# Patient Record
Sex: FEMALE | Race: WHITE | NOT HISPANIC OR LATINO | URBAN - METROPOLITAN AREA
[De-identification: names, ages, dates, MRNs, and addresses within clinical notes are randomized per-mention and may not be internally consistent; named-entity substitution may affect disease eponyms.]

---

## 2025-05-13 ENCOUNTER — INPATIENT (INPATIENT)
Facility: HOSPITAL | Age: 45
LOS: 0 days | Discharge: HOME | End: 2025-05-14
Attending: STUDENT IN AN ORGANIZED HEALTH CARE EDUCATION/TRAINING PROGRAM | Admitting: STUDENT IN AN ORGANIZED HEALTH CARE EDUCATION/TRAINING PROGRAM
Payer: COMMERCIAL

## 2025-05-13 VITALS
SYSTOLIC BLOOD PRESSURE: 99 MMHG | OXYGEN SATURATION: 96 % | DIASTOLIC BLOOD PRESSURE: 60 MMHG | TEMPERATURE: 101 F | RESPIRATION RATE: 16 BRPM | HEIGHT: 65 IN | HEART RATE: 97 BPM | WEIGHT: 130.07 LBS

## 2025-05-13 DIAGNOSIS — Z29.9 ENCOUNTER FOR PROPHYLACTIC MEASURES, UNSPECIFIED: ICD-10-CM

## 2025-05-13 DIAGNOSIS — A41.9 SEPSIS, UNSPECIFIED ORGANISM: ICD-10-CM

## 2025-05-13 DIAGNOSIS — D64.9 ANEMIA, UNSPECIFIED: ICD-10-CM

## 2025-05-13 DIAGNOSIS — R10.9 UNSPECIFIED ABDOMINAL PAIN: ICD-10-CM

## 2025-05-13 DIAGNOSIS — Z90.721 ACQUIRED ABSENCE OF OVARIES, UNILATERAL: Chronic | ICD-10-CM

## 2025-05-13 DIAGNOSIS — Z90.721 ACQUIRED ABSENCE OF OVARIES, UNILATERAL: ICD-10-CM

## 2025-05-13 DIAGNOSIS — N12 TUBULO-INTERSTITIAL NEPHRITIS, NOT SPECIFIED AS ACUTE OR CHRONIC: ICD-10-CM

## 2025-05-13 LAB
ADD ON TEST-SPECIMEN IN LAB: SIGNIFICANT CHANGE UP
ANION GAP SERPL CALC-SCNC: 9 MMOL/L — SIGNIFICANT CHANGE UP (ref 5–17)
APPEARANCE UR: CLEAR — SIGNIFICANT CHANGE UP
BASOPHILS # BLD AUTO: 0.02 K/UL — SIGNIFICANT CHANGE UP (ref 0–0.2)
BASOPHILS NFR BLD AUTO: 0.3 % — SIGNIFICANT CHANGE UP (ref 0–2)
BILIRUB UR-MCNC: NEGATIVE — SIGNIFICANT CHANGE UP
BUN SERPL-MCNC: 6 MG/DL — LOW (ref 7–23)
CALCIUM SERPL-MCNC: 8.7 MG/DL — SIGNIFICANT CHANGE UP (ref 8.4–10.5)
CHLORIDE SERPL-SCNC: 100 MMOL/L — SIGNIFICANT CHANGE UP (ref 96–108)
CO2 SERPL-SCNC: 25 MMOL/L — SIGNIFICANT CHANGE UP (ref 22–31)
COLOR SPEC: YELLOW — SIGNIFICANT CHANGE UP
CREAT SERPL-MCNC: 0.75 MG/DL — SIGNIFICANT CHANGE UP (ref 0.5–1.3)
DIFF PNL FLD: ABNORMAL
EGFR: 102 ML/MIN/1.73M2 — SIGNIFICANT CHANGE UP
EGFR: 102 ML/MIN/1.73M2 — SIGNIFICANT CHANGE UP
EOSINOPHIL # BLD AUTO: 0.01 K/UL — SIGNIFICANT CHANGE UP (ref 0–0.5)
EOSINOPHIL NFR BLD AUTO: 0.1 % — SIGNIFICANT CHANGE UP (ref 0–6)
FLUAV AG NPH QL: SIGNIFICANT CHANGE UP
FLUBV AG NPH QL: SIGNIFICANT CHANGE UP
GLUCOSE SERPL-MCNC: 138 MG/DL — HIGH (ref 70–99)
GLUCOSE UR QL: NEGATIVE MG/DL — SIGNIFICANT CHANGE UP
HCG UR QL: NEGATIVE — SIGNIFICANT CHANGE UP
HCT VFR BLD CALC: 30.9 % — LOW (ref 34.5–45)
HGB BLD-MCNC: 10.8 G/DL — LOW (ref 11.5–15.5)
IMM GRANULOCYTES NFR BLD AUTO: 0.3 % — SIGNIFICANT CHANGE UP (ref 0–0.9)
KETONES UR QL: 40 MG/DL
LACTATE SERPL-SCNC: 1 MMOL/L — SIGNIFICANT CHANGE UP (ref 0.5–2)
LEUKOCYTE ESTERASE UR-ACNC: ABNORMAL
LYMPHOCYTES # BLD AUTO: 0.74 K/UL — LOW (ref 1–3.3)
LYMPHOCYTES # BLD AUTO: 9.6 % — LOW (ref 13–44)
MCHC RBC-ENTMCNC: 31.7 PG — SIGNIFICANT CHANGE UP (ref 27–34)
MCHC RBC-ENTMCNC: 35 G/DL — SIGNIFICANT CHANGE UP (ref 32–36)
MCV RBC AUTO: 90.6 FL — SIGNIFICANT CHANGE UP (ref 80–100)
MONOCYTES # BLD AUTO: 0.72 K/UL — SIGNIFICANT CHANGE UP (ref 0–0.9)
MONOCYTES NFR BLD AUTO: 9.4 % — SIGNIFICANT CHANGE UP (ref 2–14)
NEUTROPHILS # BLD AUTO: 6.16 K/UL — SIGNIFICANT CHANGE UP (ref 1.8–7.4)
NEUTROPHILS NFR BLD AUTO: 80.3 % — HIGH (ref 43–77)
NITRITE UR-MCNC: NEGATIVE — SIGNIFICANT CHANGE UP
NRBC BLD AUTO-RTO: 0 /100 WBCS — SIGNIFICANT CHANGE UP (ref 0–0)
PH UR: 6 — SIGNIFICANT CHANGE UP (ref 5–8)
PLATELET # BLD AUTO: 170 K/UL — SIGNIFICANT CHANGE UP (ref 150–400)
POTASSIUM SERPL-MCNC: 4 MMOL/L — SIGNIFICANT CHANGE UP (ref 3.5–5.3)
POTASSIUM SERPL-SCNC: 4 MMOL/L — SIGNIFICANT CHANGE UP (ref 3.5–5.3)
PROT UR-MCNC: 30 MG/DL
RBC # BLD: 3.41 M/UL — LOW (ref 3.8–5.2)
RBC # FLD: 12.7 % — SIGNIFICANT CHANGE UP (ref 10.3–14.5)
RSV RNA NPH QL NAA+NON-PROBE: SIGNIFICANT CHANGE UP
SARS-COV-2 RNA SPEC QL NAA+PROBE: SIGNIFICANT CHANGE UP
SODIUM SERPL-SCNC: 134 MMOL/L — LOW (ref 135–145)
SOURCE RESPIRATORY: SIGNIFICANT CHANGE UP
SP GR SPEC: 1.01 — SIGNIFICANT CHANGE UP (ref 1–1.03)
UROBILINOGEN FLD QL: 0.2 MG/DL — SIGNIFICANT CHANGE UP (ref 0.2–1)
WBC # BLD: 7.67 K/UL — SIGNIFICANT CHANGE UP (ref 3.8–10.5)
WBC # FLD AUTO: 7.67 K/UL — SIGNIFICANT CHANGE UP (ref 3.8–10.5)

## 2025-05-13 PROCEDURE — 93010 ELECTROCARDIOGRAM REPORT: CPT

## 2025-05-13 PROCEDURE — 99285 EMERGENCY DEPT VISIT HI MDM: CPT

## 2025-05-13 PROCEDURE — 99223 1ST HOSP IP/OBS HIGH 75: CPT

## 2025-05-13 PROCEDURE — 74176 CT ABD & PELVIS W/O CONTRAST: CPT | Mod: 26

## 2025-05-13 RX ORDER — ACETAMINOPHEN 500 MG/5ML
650 LIQUID (ML) ORAL EVERY 6 HOURS
Refills: 0 | Status: DISCONTINUED | OUTPATIENT
Start: 2025-05-13 | End: 2025-05-14

## 2025-05-13 RX ORDER — KETOROLAC TROMETHAMINE 30 MG/ML
15 INJECTION, SOLUTION INTRAMUSCULAR; INTRAVENOUS ONCE
Refills: 0 | Status: DISCONTINUED | OUTPATIENT
Start: 2025-05-13 | End: 2025-05-13

## 2025-05-13 RX ORDER — MELATONIN 5 MG
3 TABLET ORAL AT BEDTIME
Refills: 0 | Status: DISCONTINUED | OUTPATIENT
Start: 2025-05-13 | End: 2025-05-14

## 2025-05-13 RX ORDER — ACETAMINOPHEN 500 MG/5ML
1000 LIQUID (ML) ORAL ONCE
Refills: 0 | Status: COMPLETED | OUTPATIENT
Start: 2025-05-13 | End: 2025-05-13

## 2025-05-13 RX ORDER — PIPERACILLIN-TAZO-DEXTROSE,ISO 2.25G/50ML
3.38 IV SOLUTION, PIGGYBACK PREMIX FROZEN(ML) INTRAVENOUS ONCE
Refills: 0 | Status: COMPLETED | OUTPATIENT
Start: 2025-05-13 | End: 2025-05-13

## 2025-05-13 RX ORDER — KETOROLAC TROMETHAMINE 30 MG/ML
15 INJECTION, SOLUTION INTRAMUSCULAR; INTRAVENOUS EVERY 12 HOURS
Refills: 0 | Status: DISCONTINUED | OUTPATIENT
Start: 2025-05-13 | End: 2025-05-14

## 2025-05-13 RX ORDER — CEFTRIAXONE 500 MG/1
2000 INJECTION, POWDER, FOR SOLUTION INTRAMUSCULAR; INTRAVENOUS EVERY 24 HOURS
Refills: 0 | Status: DISCONTINUED | OUTPATIENT
Start: 2025-05-13 | End: 2025-05-14

## 2025-05-13 RX ADMIN — Medication 1850 MILLILITER(S): at 15:48

## 2025-05-13 RX ADMIN — Medication 3 MILLIGRAM(S): at 22:58

## 2025-05-13 RX ADMIN — KETOROLAC TROMETHAMINE 15 MILLIGRAM(S): 30 INJECTION, SOLUTION INTRAMUSCULAR; INTRAVENOUS at 18:52

## 2025-05-13 RX ADMIN — Medication 200 GRAM(S): at 15:48

## 2025-05-13 RX ADMIN — Medication 400 MILLIGRAM(S): at 18:52

## 2025-05-13 RX ADMIN — CEFTRIAXONE 100 MILLIGRAM(S): 500 INJECTION, POWDER, FOR SOLUTION INTRAMUSCULAR; INTRAVENOUS at 22:32

## 2025-05-13 NOTE — H&P ADULT - PROBLEM SELECTOR PLAN 1
Patient met 2/4 sepsis criteria ( HR, fever) with source likely UTI and pyelonephritis.     - Start CTX 2 g ,till afebrile then finish a 7 day course with orals   - FU Bcx, Ucx   - Tylenol for fever   - Can give fluids if hypotension

## 2025-05-13 NOTE — ED PROVIDER NOTE - OBJECTIVE STATEMENT
44 year old F with no pertinent pmh presenting with fever X 1 day. Pt had UTI over last several days, was on macrobid for 3 doses. Now with L flank pain and fever, constant, radiating downwards. Was switched to cipro, took two doses with persistent fever. Urinary complaints improved. No ho kidney stones. No other acute complaints. ROS as above.

## 2025-05-13 NOTE — H&P ADULT - NSHPPHYSICALEXAM_GEN_ALL_CORE
PHYSICAL EXAM:  GENERAL: NAD, lying in bed comfortably  HEAD:  Atraumatic, Normocephalic  EYES: EOMI, PERRLA, conjunctiva and sclera clear  ENT: Moist mucous membranes  NECK: Supple, No JVD  CHEST/LUNG: Clear to auscultation bilaterally; No rales, rhonchi, wheezing, or rubs. Unlabored respirations  HEART: Regular rate and rhythm; No murmurs, rubs, or gallops  ABDOMEN: Bowel sounds present; Soft, left flank tenderness, Nondistended. No hepatomegally  EXTREMITIES:  2+ Peripheral Pulses, brisk capillary refill. No clubbing, cyanosis, or edema  NERVOUS SYSTEM:  Alert & Oriented X3, speech clear. No deficits   MSK: FROM all 4 extremities, full and equal strength  SKIN: No rashes or lesions Vital Signs Last 24 Hrs  T(C): 36.9 (13 May 2025 20:35), Max: 38.3 (13 May 2025 15:13)  T(F): 98.4 (13 May 2025 20:35), Max: 101 (13 May 2025 15:13)  HR: 61 (13 May 2025 20:35) (61 - 97)  BP: 96/59 (13 May 2025 20:35) (96/59 - 103/62)    RR: 17 (13 May 2025 20:35) (16 - 18)  SpO2: 97% (13 May 2025 20:35) (96% - 98%)    O2 Parameters below as of 13 May 2025 20:35  Patient On (Oxygen Delivery Method): room air      PHYSICAL EXAM:  GENERAL: NAD, lying in bed comfortably  HEAD:  Atraumatic, Normocephalic  EYES: EOMI, PERRLA, conjunctiva and sclera clear  ENT: Moist mucous membranes  NECK: Supple, No JVD  CHEST/LUNG: Clear to auscultation bilaterally; No rales, rhonchi, wheezing, or rubs. Unlabored respirations  HEART: Regular rate and rhythm; No murmurs, rubs, or gallops  ABDOMEN: Bowel sounds present; Soft, left flank tenderness, Nondistended. No hepatomegally  EXTREMITIES:  2+ Peripheral Pulses, brisk capillary refill. No clubbing, cyanosis, or edema  NERVOUS SYSTEM:  Alert & Oriented X3, speech clear. No deficits   MSK: FROM all 4 extremities, full and equal strength  SKIN: No rashes or lesions

## 2025-05-13 NOTE — ED PROVIDER NOTE - NS ED ROS FT
Constitutional: + fever or chills  Eyes: No discharge or drainage  Ears, Nose, Mouth, Throat: No nasal discharge, no sore throat  Cardiovascular: No chest pain, no palpitations  Respiratory: No shortness of breath, no cough  Gastrointestinal: No nausea or vomiting, +abdominal pain, no diarrhea or constipation  Musculoskeletal: No joint pain, no swelling  Skin: No rashes or lesions  Neurological: No numbness, weakness, tingling, no headache  Psychiatric: No depression

## 2025-05-13 NOTE — ED ADULT TRIAGE NOTE - CHIEF COMPLAINT QUOTE
left sided flank pain since yesterday, fevers at home  currently being treated ciprofloxacin for UTI starting on Sunday

## 2025-05-13 NOTE — H&P ADULT - HISTORY OF PRESENT ILLNESS
44 year old F with no pertinent pmh presenting with fever X 1 day. Pt had UTI over last several days, was on macrobid for 3 doses. Now with L flank pain and fever, constant, radiating downwards. Was switched to cipro, took two doses with persistent fever. Urinary complaints improved. No ho kidney stones. No other acute complaints. ROS as above.    In the ED:  Initial vital signs: T: 101 F, HR: 97 , BP: 99/60 , R: 16, SpO2: 96 % on RA  ED course:   Labs: significant for Hb 10.8, Neutrophil 80 %, UA + bacteria  Imaging:  CT A/P non con:  possible left sided acute pyelonephritis   EKG: NSR  Medications:  44 year old F with PMH of recurrent UTIs (last was 2 years ago), left oophorectomy following ectopic pregnancy, presenting with a week of dysuria and fever. She first was on macrobid for 3 doses. Then she over the weekend, she started having flank pain and fever. She went to her PCP office and was prescribed Cipro. She took a dose last night and one this morning. Despite the antibiotics, the patient kept spiking fever with 104 this morning associated with chills and increasing flank pain prompting her to come to the ED.   In the ED:  Initial vital signs: T: 101 F, HR: 97 , BP: 99/60 , R: 16, SpO2: 96 % on RA  ED course:   Labs: significant for Hb 10.8, Neutrophil 80 %, UA + bacteria  Imaging:  CT A/P non con:  possible left sided acute pyelonephritis   EKG: NSR  Medications: 2 L NS, 15 Toradol IV, 1 g Tylenol, 3.375 Zosyn

## 2025-05-13 NOTE — H&P ADULT - NSHPLABSRESULTS_GEN_ALL_CORE
LABS:                         10.8   7.67  )-----------( 170      ( 13 May 2025 15:35 )             30.9         134[L]  |  100  |  6[L]  ----------------------------<  138[H]  4.0   |  25  |  0.75    Ca    8.7      13 May 2025 15:35    TPro  6.8  /  Alb  3.8  /  TBili  0.2  /  DBili  0.1  /  AST  19  /  ALT  10  /  AlkPhos  46      PT/INR - ( 13 May 2025 15:35 )   PT: 12.8 sec;   INR: 1.11          PTT - ( 13 May 2025 15:35 )  PTT:28.8 sec  Urinalysis Basic - ( 13 May 2025 15:35 )    Color: Yellow / Appearance: Clear / S.015 / pH: x  Gluc: 138 mg/dL / Ketone: x  / Bili: Negative / Urobili: 0.2 mg/dL   Blood: x / Protein: 30 mg/dL / Nitrite: Negative   Leuk Esterase: Small / RBC: 3 /HPF / WBC 7 /HPF   Sq Epi: x / Non Sq Epi: 15 /HPF / Bacteria: Occasional /HPF        Lactate, Blood: 1.0 mmol/L ( @ 15:54)      RADIOLOGY, EKG & ADDITIONAL TESTS: Reviewed.

## 2025-05-13 NOTE — H&P ADULT - PROBLEM SELECTOR PLAN 4
Hb 10.8 on admission. Baseline 12 on 2015. MCV WNL.    - Iron studies, folate, B12  - Active type and screen as needed  - Maintain Hb > 7

## 2025-05-13 NOTE — ED ADULT NURSE NOTE - HIV OFFER
Is This A New Presentation, Or A Follow-Up?: Growth What Type Of Note Output Would You Prefer (Optional)?: Bullet Format Has Your Skin Lesion Been Treated?: not been treated Additional History: Patient states the growth has been drained in the past Opt out

## 2025-05-13 NOTE — H&P ADULT - NSHPREVIEWOFSYSTEMS_GEN_ALL_CORE
2 REVIEW OF SYSTEMS:      EYES/ENT: No visual changes;  No vertigo or throat pain   NECK: No pain or stiffness  RESPIRATORY: No cough, wheezing, hemoptysis; No shortness of breath  CARDIOVASCULAR: No chest pain or palpitations  GASTROINTESTINAL: No abdominal or epigastric pain. No nausea, vomiting, or hematemesis; No diarrhea or constipation. No melena or hematochezia.  NEUROLOGICAL: No numbness or weakness  SKIN: No itching, rashes

## 2025-05-13 NOTE — H&P ADULT - ASSESSMENT
44 year old F with PMH of recurrent UTIs (last was 2 years ago), left oophorectomy following ectopic pregnancy, presenting with a week of dysuria and fever. No improvement on oral Macrobid and Cipro. Found to have a likely pyelonephritis on CT. Admitted for IV antibiotics.

## 2025-05-13 NOTE — H&P ADULT - PROBLEM SELECTOR PLAN 6
Fluids: none  Electrolytes: replete as needed  Diet: regular  DVT: not needed  Code: full  Dispo: [facility]

## 2025-05-13 NOTE — H&P ADULT - ATTENDING COMMENTS
45 yo F with PMHx recurrent UTI, ectopic pregnancy (s/p L oophorectomy) px from home with 1-2d hx of L flank pain and fever, admitted for further evaluation and management of sepsis 2/2 pyelonephritis.      Meeting 2/4 SIRS criteria (Tmax 101F, HR>90). Remainder of VSS. EKG reviewed (NSR, incomplete RBBB, ). Labs reviewed. No significant leukocytosis or bandemia. Hb 10.8 (MCV 90.6). Remainder of CBC/CMP largely within normal limits. UA with 40 ketone, small blood/RBC, 7 WBC, occasional bacteria. Pt currently being tx for UTI with course of Macrobid > Ciprofloxacin prescribed by outpatient provider. CTAP demonstrating subtle infiltration of L perinephric fat suspicious for L pyelonephritis. Additionally with L CVA tenderness on exam. Favor tx with IV abx pending additional cx data. May be confounded by recent abx tx.     [ ] CTX 2g IV Q24   [ ] Blood cx x2 + Urine cx (Sent from ED)   [ ] Outpatient PCP collateral regarding recent cx data*

## 2025-05-13 NOTE — ED ADULT NURSE NOTE - OBJECTIVE STATEMENT
44yF no pmhx presents to the ER complaining of left sided flank pain. Patient states on Sunday "diagnosed with UTI and then it got worse and yesterday my doctor told me it spread to the kidneys and started me on new antibiotics". Endorses fever highest 104, has been taking Advil and Tylenol regularly. Pain upon urination has improved. Endorses nausea. Denies Cp/SOB, V/D.

## 2025-05-13 NOTE — ED PROVIDER NOTE - CLINICAL SUMMARY MEDICAL DECISION MAKING FREE TEXT BOX
45 yo with flank pain, fever, concern for possible pyelo vs infected stone, will do sepsis workup, ct to evaluate for stone, iv abx, reassess

## 2025-05-14 ENCOUNTER — TRANSCRIPTION ENCOUNTER (OUTPATIENT)
Age: 45
End: 2025-05-14

## 2025-05-14 VITALS
DIASTOLIC BLOOD PRESSURE: 70 MMHG | OXYGEN SATURATION: 97 % | RESPIRATION RATE: 18 BRPM | TEMPERATURE: 98 F | SYSTOLIC BLOOD PRESSURE: 109 MMHG | HEART RATE: 68 BPM

## 2025-05-14 LAB
ALBUMIN SERPL ELPH-MCNC: 3.8 G/DL — SIGNIFICANT CHANGE UP (ref 3.3–5)
ALP SERPL-CCNC: 43 U/L — SIGNIFICANT CHANGE UP (ref 40–120)
ALT FLD-CCNC: 26 U/L — SIGNIFICANT CHANGE UP (ref 10–45)
ANION GAP SERPL CALC-SCNC: 10 MMOL/L — SIGNIFICANT CHANGE UP (ref 5–17)
AST SERPL-CCNC: 32 U/L — SIGNIFICANT CHANGE UP (ref 10–40)
BASOPHILS # BLD AUTO: 0.01 K/UL — SIGNIFICANT CHANGE UP (ref 0–0.2)
BASOPHILS NFR BLD AUTO: 0.2 % — SIGNIFICANT CHANGE UP (ref 0–2)
BILIRUB SERPL-MCNC: <0.2 MG/DL — SIGNIFICANT CHANGE UP (ref 0.2–1.2)
BUN SERPL-MCNC: 5 MG/DL — LOW (ref 7–23)
CALCIUM SERPL-MCNC: 8.6 MG/DL — SIGNIFICANT CHANGE UP (ref 8.4–10.5)
CHLORIDE SERPL-SCNC: 104 MMOL/L — SIGNIFICANT CHANGE UP (ref 96–108)
CO2 SERPL-SCNC: 25 MMOL/L — SIGNIFICANT CHANGE UP (ref 22–31)
CREAT SERPL-MCNC: 0.66 MG/DL — SIGNIFICANT CHANGE UP (ref 0.5–1.3)
EGFR: 112 ML/MIN/1.73M2 — SIGNIFICANT CHANGE UP
EGFR: 112 ML/MIN/1.73M2 — SIGNIFICANT CHANGE UP
EOSINOPHIL # BLD AUTO: 0.02 K/UL — SIGNIFICANT CHANGE UP (ref 0–0.5)
EOSINOPHIL NFR BLD AUTO: 0.4 % — SIGNIFICANT CHANGE UP (ref 0–6)
FERRITIN SERPL-MCNC: 172 NG/ML — HIGH (ref 15–150)
FOLATE SERPL-MCNC: 18.8 NG/ML — SIGNIFICANT CHANGE UP
GLUCOSE SERPL-MCNC: 104 MG/DL — HIGH (ref 70–99)
HCT VFR BLD CALC: 29.4 % — LOW (ref 34.5–45)
HGB BLD-MCNC: 9.8 G/DL — LOW (ref 11.5–15.5)
IMM GRANULOCYTES NFR BLD AUTO: 0.2 % — SIGNIFICANT CHANGE UP (ref 0–0.9)
IRON SATN MFR SERPL: 4 % — LOW (ref 14–50)
IRON SATN MFR SERPL: 7 UG/DL — LOW (ref 30–160)
LYMPHOCYTES # BLD AUTO: 0.94 K/UL — LOW (ref 1–3.3)
LYMPHOCYTES # BLD AUTO: 19.7 % — SIGNIFICANT CHANGE UP (ref 13–44)
MAGNESIUM SERPL-MCNC: 2 MG/DL — SIGNIFICANT CHANGE UP (ref 1.6–2.6)
MCHC RBC-ENTMCNC: 31.5 PG — SIGNIFICANT CHANGE UP (ref 27–34)
MCHC RBC-ENTMCNC: 33.3 G/DL — SIGNIFICANT CHANGE UP (ref 32–36)
MCV RBC AUTO: 94.5 FL — SIGNIFICANT CHANGE UP (ref 80–100)
MONOCYTES # BLD AUTO: 0.61 K/UL — SIGNIFICANT CHANGE UP (ref 0–0.9)
MONOCYTES NFR BLD AUTO: 12.8 % — SIGNIFICANT CHANGE UP (ref 2–14)
NEUTROPHILS # BLD AUTO: 3.17 K/UL — SIGNIFICANT CHANGE UP (ref 1.8–7.4)
NEUTROPHILS NFR BLD AUTO: 66.7 % — SIGNIFICANT CHANGE UP (ref 43–77)
NRBC BLD AUTO-RTO: 0 /100 WBCS — SIGNIFICANT CHANGE UP (ref 0–0)
PHOSPHATE SERPL-MCNC: 2.6 MG/DL — SIGNIFICANT CHANGE UP (ref 2.5–4.5)
PLATELET # BLD AUTO: 165 K/UL — SIGNIFICANT CHANGE UP (ref 150–400)
POTASSIUM SERPL-MCNC: 4.4 MMOL/L — SIGNIFICANT CHANGE UP (ref 3.5–5.3)
POTASSIUM SERPL-SCNC: 4.4 MMOL/L — SIGNIFICANT CHANGE UP (ref 3.5–5.3)
PROT SERPL-MCNC: 6 G/DL — SIGNIFICANT CHANGE UP (ref 6–8.3)
RBC # BLD: 3.11 M/UL — LOW (ref 3.8–5.2)
RBC # FLD: 13 % — SIGNIFICANT CHANGE UP (ref 10.3–14.5)
SODIUM SERPL-SCNC: 139 MMOL/L — SIGNIFICANT CHANGE UP (ref 135–145)
TIBC SERPL-MCNC: 197 UG/DL — LOW (ref 220–430)
TRANSFERRIN SERPL-MCNC: 162 MG/DL — LOW (ref 200–360)
UIBC SERPL-MCNC: 190 UG/DL — SIGNIFICANT CHANGE UP (ref 110–370)
VIT B12 SERPL-MCNC: 349 PG/ML — SIGNIFICANT CHANGE UP (ref 232–1245)
WBC # BLD: 4.76 K/UL — SIGNIFICANT CHANGE UP (ref 3.8–10.5)
WBC # FLD AUTO: 4.76 K/UL — SIGNIFICANT CHANGE UP (ref 3.8–10.5)

## 2025-05-14 PROCEDURE — 83690 ASSAY OF LIPASE: CPT

## 2025-05-14 PROCEDURE — 82607 VITAMIN B-12: CPT

## 2025-05-14 PROCEDURE — 80053 COMPREHEN METABOLIC PANEL: CPT

## 2025-05-14 PROCEDURE — 96374 THER/PROPH/DIAG INJ IV PUSH: CPT

## 2025-05-14 PROCEDURE — 82728 ASSAY OF FERRITIN: CPT

## 2025-05-14 PROCEDURE — 82746 ASSAY OF FOLIC ACID SERUM: CPT

## 2025-05-14 PROCEDURE — 80048 BASIC METABOLIC PNL TOTAL CA: CPT

## 2025-05-14 PROCEDURE — 87637 SARSCOV2&INF A&B&RSV AMP PRB: CPT

## 2025-05-14 PROCEDURE — 87086 URINE CULTURE/COLONY COUNT: CPT

## 2025-05-14 PROCEDURE — 85025 COMPLETE CBC W/AUTO DIFF WBC: CPT

## 2025-05-14 PROCEDURE — 99239 HOSP IP/OBS DSCHRG MGMT >30: CPT | Mod: GC

## 2025-05-14 PROCEDURE — 81025 URINE PREGNANCY TEST: CPT

## 2025-05-14 PROCEDURE — 84466 ASSAY OF TRANSFERRIN: CPT

## 2025-05-14 PROCEDURE — 96375 TX/PRO/DX INJ NEW DRUG ADDON: CPT

## 2025-05-14 PROCEDURE — 74176 CT ABD & PELVIS W/O CONTRAST: CPT

## 2025-05-14 PROCEDURE — 87040 BLOOD CULTURE FOR BACTERIA: CPT

## 2025-05-14 PROCEDURE — 84100 ASSAY OF PHOSPHORUS: CPT

## 2025-05-14 PROCEDURE — 83550 IRON BINDING TEST: CPT

## 2025-05-14 PROCEDURE — 85730 THROMBOPLASTIN TIME PARTIAL: CPT

## 2025-05-14 PROCEDURE — 36415 COLL VENOUS BLD VENIPUNCTURE: CPT

## 2025-05-14 PROCEDURE — 80076 HEPATIC FUNCTION PANEL: CPT

## 2025-05-14 PROCEDURE — 83735 ASSAY OF MAGNESIUM: CPT

## 2025-05-14 PROCEDURE — 81001 URINALYSIS AUTO W/SCOPE: CPT

## 2025-05-14 PROCEDURE — 83605 ASSAY OF LACTIC ACID: CPT

## 2025-05-14 PROCEDURE — 99285 EMERGENCY DEPT VISIT HI MDM: CPT | Mod: 25

## 2025-05-14 PROCEDURE — 85610 PROTHROMBIN TIME: CPT

## 2025-05-14 PROCEDURE — 93005 ELECTROCARDIOGRAM TRACING: CPT

## 2025-05-14 PROCEDURE — 83540 ASSAY OF IRON: CPT

## 2025-05-14 RX ORDER — IBUPROFEN 200 MG
400 TABLET ORAL ONCE
Refills: 0 | Status: DISCONTINUED | OUTPATIENT
Start: 2025-05-14 | End: 2025-05-14

## 2025-05-14 RX ORDER — FERROUS SULFATE 137(45) MG
1 TABLET, EXTENDED RELEASE ORAL
Qty: 15 | Refills: 2
Start: 2025-05-14 | End: 2025-08-11

## 2025-05-14 RX ORDER — ACETAMINOPHEN 500 MG/5ML
650 LIQUID (ML) ORAL EVERY 6 HOURS
Refills: 0 | Status: DISCONTINUED | OUTPATIENT
Start: 2025-05-14 | End: 2025-05-14

## 2025-05-14 RX ORDER — CEFTRIAXONE 500 MG/1
2000 INJECTION, POWDER, FOR SOLUTION INTRAMUSCULAR; INTRAVENOUS EVERY 24 HOURS
Refills: 0 | Status: DISCONTINUED | OUTPATIENT
Start: 2025-05-14 | End: 2025-05-14

## 2025-05-14 RX ADMIN — Medication 650 MILLIGRAM(S): at 03:18

## 2025-05-14 RX ADMIN — Medication 650 MILLIGRAM(S): at 13:58

## 2025-05-14 RX ADMIN — CEFTRIAXONE 100 MILLIGRAM(S): 500 INJECTION, POWDER, FOR SOLUTION INTRAMUSCULAR; INTRAVENOUS at 11:58

## 2025-05-14 NOTE — PROGRESS NOTE ADULT - PROBLEM SELECTOR PLAN 3
2/2 ectopic pregnancy in 2019. No current issues 2/2 ectopic pregnancy in 2019. No current issues  No other

## 2025-05-14 NOTE — DISCHARGE NOTE PROVIDER - NSDCMRMEDTOKEN_GEN_ALL_CORE_FT
Bactrim  mg-160 mg oral tablet: 1 tab(s) orally 2 times a day Please take 1 tablet twice a day starting on 5/15 AM   Bactrim  mg-160 mg oral tablet: 1 tab(s) orally 2 times a day Please take 1 tablet twice a day starting on 5/15 AM  ferrous sulfate 325 mg (65 mg elemental iron) oral tablet: 1 tab(s) orally every other day Please take one tablet every other day

## 2025-05-14 NOTE — PROGRESS NOTE ADULT - PROBLEM SELECTOR PLAN 6
Fluids: none  Electrolytes: replete as needed  Diet: regular  DVT: not needed  Code: full  Dispo: [facility]

## 2025-05-14 NOTE — DISCHARGE NOTE PROVIDER - CARE PROVIDER_API CALL
CHRISTA LUONG  [address] [place] [address]  Phone: [phone]  Fax: [phone]  Established Patient  Follow Up Time: 1 week

## 2025-05-14 NOTE — DISCHARGE NOTE PROVIDER - NSDCFUADDAPPT_GEN_ALL_CORE_FT
Please follow up with your primary care provider within 1 weeks of this hospitalization. Please call the office to make an appointment.   Please follow up with your primary care provider within 1 week of this hospitalization. Please call the office to make an appointment.

## 2025-05-14 NOTE — DISCHARGE NOTE NURSING/CASE MANAGEMENT/SOCIAL WORK - FINANCIAL ASSISTANCE
[facility] provides services at a reduced cost to those who are determined to be eligible through [facility]’s financial assistance program. Information regarding [facility]’s financial assistance program can be found by going to [email or web address] or by calling [phone].

## 2025-05-14 NOTE — DISCHARGE NOTE NURSING/CASE MANAGEMENT/SOCIAL WORK - NSDCFUADDAPPT_GEN_ALL_CORE_FT
Please follow up with your primary care provider within 1 week of this hospitalization. Please call the office to make an appointment.

## 2025-05-14 NOTE — PROGRESS NOTE ADULT - PROBLEM SELECTOR PLAN 1
Patient met 2/4 sepsis criteria ( HR, fever) with source likely UTI and pyelonephritis.     - Start CTX 2 g ,till afebrile then finish a 7 day course with orals   - FU Bcx, Ucx   - Tylenol for fever   - Can give fluids if hypotension Patient met 2/4 sepsis criteria ( HR, fever) with source likely UTI and pyelonephritis.   Culture data obtained from outpatient records on original culture > appears to be pan-sensitive E. coli    - Start CTX 2 g until afebrile then finish a 7 day course with orals   - FU Bcx, Ucx   - Tylenol for fever   - Can give fluids if hypotension Patient met 2/4 sepsis criteria ( HR, fever) with source likely UTI and pyelonephritis.   Culture data obtained from outpatient records on original culture > appears to be pan-sensitive E. coli    - Start CTX 2 g > transition to bactrim to complete 10 day course  - FU Bcx, Ucx   - Tylenol for fever   - Can give fluids if hypotension

## 2025-05-14 NOTE — DISCHARGE NOTE NURSING/CASE MANAGEMENT/SOCIAL WORK - PATIENT PORTAL LINK FT
You can access the FollowMyHealth Patient Portal offered by [facility] by registering at the following website: [email or web address]. By joining [employer]’s FollowMyHealth portal, you will also be able to view your health information using other applications (apps) compatible with our system.

## 2025-05-14 NOTE — DISCHARGE NOTE PROVIDER - ATTENDING DISCHARGE PHYSICAL EXAMINATION:
GEN: Resting comfortably in NAD  ENMT: Moist oral mucosa; no conjunctival injection  RESP: No respiratory distress, no use of accessory muscles; CTA b/l, no WRR  CV: RRR, +S1S2, no MRG; no JVD; no peripheral edema  2+ radial, DP, PT   GI: Soft, ND, no rebound, no guarding.   MSK: No digital clubbing or cyanosis. Mild L flank tenderness  SKIN: No rashes or ulcers noted  NEURO: Answers questions appropriately, moving all extremities spontaneously GEN: Resting comfortably in NAD  ENMT: Moist oral mucosa; no conjunctival injection  RESP: No respiratory distress, no use of accessory muscles; CTA b/l, no WRR  CV: RRR, +S1S2, no MRG; no JVD; no peripheral edema  2+ radial, DP, PT   GI: Soft, ND, no rebound, no guarding..   MSK: No digital clubbing or cyanosis. Mild L flank tenderness  SKIN: No rashes or ulcers noted  NEURO: Answers questions appropriately, moving all extremities spontaneously

## 2025-05-14 NOTE — PROGRESS NOTE ADULT - SUBJECTIVE AND OBJECTIVE BOX
OVERNIGHT EVENTS:  No acute events overnight.    SUBJECTIVE:  The patient was seen and examined at the bedside this AM.     VITAL SIGNS:  Vital Signs Last 12 Hrs  T(F): 98.1 (05-14-25 @ 05:49), Max: 98.4 (05-13-25 @ 20:35)  HR: 61 (05-14-25 @ 05:49) (61 - 62)  BP: 100/65 (05-14-25 @ 05:49) (96/59 - 100/65)  BP(mean): 77 (05-14-25 @ 05:49) (77 - 77)  RR: 18 (05-14-25 @ 05:49) (17 - 18)  SpO2: 96% (05-14-25 @ 05:49) (96% - 98%)  I&O's Summary      PHYSICAL EXAM:  GEN: Resting comfortably in NAD  ENMT: Moist oral mucosa; no conjunctival injection  RESP: No respiratory distress, no use of accessory muscles; CTA b/l, no WRR  CV: RRR, +S1S2, no MRG; no JVD; no peripheral edema  2+ radial, DP, PT   GI: Soft, NT, ND, no rebound, no guarding  MSK: No digital clubbing or cyanosis  SKIN: No rashes or ulcers noted  NEURO: Answers questions appropriately, moving all extremities spontaneously    MEDICATIONS:  MEDICATIONS  (STANDING):  cefTRIAXone   IVPB 2000 milliGRAM(s) IV Intermittent every 24 hours    MEDICATIONS  (PRN):  acetaminophen     Tablet .. 650 milliGRAM(s) Oral every 6 hours PRN Temp greater or equal to 38C (100.4F), Mild Pain (1 - 3)  ketorolac   Injectable 15 milliGRAM(s) IV Push every 12 hours PRN Severe Pain (7 - 10)  melatonin 3 milliGRAM(s) Oral at bedtime PRN Insomnia      ALLERGIES:  Allergies    No Known Allergies    Intolerances        LABS:                        10.8   7.67  )-----------( 170      ( 13 May 2025 15:35 )             30.9     05-13    134[L]  |  100  |  6[L]  ----------------------------<  138[H]  4.0   |  25  |  0.75    Ca    8.7      13 May 2025 15:35    TPro  6.8  /  Alb  3.8  /  TBili  0.2  /  DBili  0.1  /  AST  19  /  ALT  10  /  AlkPhos  46  05-13    PT/INR - ( 13 May 2025 15:35 )   PT: 12.8 sec;   INR: 1.11          PTT - ( 13 May 2025 15:35 )  PTT:28.8 sec    RADIOLOGY & ADDITIONAL TESTS: Reviewed. OVERNIGHT EVENTS:  No acute events overnight.    SUBJECTIVE:  The patient was seen and examined at the bedside this AM. Continues to have some mild flank pain and moderate headache. Otherwise feels better than since admission. No further dysuria. Denies fevers, chest pain, palpitations, SOB, abdominal pain, nausea, vomiting, diarrhea.     VITAL SIGNS:  Vital Signs Last 12 Hrs  T(F): 98.1 (05-14-25 @ 05:49), Max: 98.4 (05-13-25 @ 20:35)  HR: 61 (05-14-25 @ 05:49) (61 - 62)  BP: 100/65 (05-14-25 @ 05:49) (96/59 - 100/65)  BP(mean): 77 (05-14-25 @ 05:49) (77 - 77)  RR: 18 (05-14-25 @ 05:49) (17 - 18)  SpO2: 96% (05-14-25 @ 05:49) (96% - 98%)  I&O's Summary      PHYSICAL EXAM:  GEN: Resting comfortably in NAD  ENMT: Moist oral mucosa; no conjunctival injection  RESP: No respiratory distress, no use of accessory muscles; CTA b/l, no WRR  CV: RRR, +S1S2, no MRG; no JVD; no peripheral edema  2+ radial, DP, PT   GI: Soft, ND, no rebound, no guarding. Mildly tender to deep palpation in LLQ.  MSK: No digital clubbing or cyanosis. Mild L flank tenderness  SKIN: No rashes or ulcers noted  NEURO: Answers questions appropriately, moving all extremities spontaneously    MEDICATIONS:  MEDICATIONS  (STANDING):  cefTRIAXone   IVPB 2000 milliGRAM(s) IV Intermittent every 24 hours    MEDICATIONS  (PRN):  acetaminophen     Tablet .. 650 milliGRAM(s) Oral every 6 hours PRN Temp greater or equal to 38C (100.4F), Mild Pain (1 - 3)  ketorolac   Injectable 15 milliGRAM(s) IV Push every 12 hours PRN Severe Pain (7 - 10)  melatonin 3 milliGRAM(s) Oral at bedtime PRN Insomnia      ALLERGIES:  Allergies    No Known Allergies    Intolerances        LABS:                        10.8   7.67  )-----------( 170      ( 13 May 2025 15:35 )             30.9     05-13    134[L]  |  100  |  6[L]  ----------------------------<  138[H]  4.0   |  25  |  0.75    Ca    8.7      13 May 2025 15:35    TPro  6.8  /  Alb  3.8  /  TBili  0.2  /  DBili  0.1  /  AST  19  /  ALT  10  /  AlkPhos  46  05-13    PT/INR - ( 13 May 2025 15:35 )   PT: 12.8 sec;   INR: 1.11          PTT - ( 13 May 2025 15:35 )  PTT:28.8 sec    RADIOLOGY & ADDITIONAL TESTS: Reviewed.

## 2025-05-14 NOTE — DISCHARGE NOTE PROVIDER - NSDCCPTREATMENT_GEN_ALL_CORE_FT
PRINCIPAL PROCEDURE  Procedure: CT abdomen  Findings and Treatment: FINDINGS:  LOWER CHEST: Low density of cardiac chambers relative to the myocardium suggesting anemia.  LIVER: Within normal limits. Reidel's lobe of liver.  BILE DUCTS: Normal caliber.  GALLBLADDER: Within normal limits.  SPLEEN: Within normal limits.  PANCREAS: Within normal limits.  ADRENALS: Within normal limits.  KIDNEYS/URETERS: No renal stones or obstructive hydronephrosis. No ureteric calculus. Mild fullness of the left renal collecting system..   There appears to be some subtle infiltration of the left perinephric fat.  BLADDER: Wall is thickened.  REPRODUCTIVE ORGANS: Uterus and adnexa/ovary within normal limits. The uterus is retroflexed. Probable 2.1 cm left ovarian follicle.  BOWEL: No bowel obstruction. Appendix is normal.  PERITONEUM/RETROPERITONEUM: Within normal limits.  VESSELS: Within normal limits.  LYMPH NODES: No lymphadenopathy.  ABDOMINAL WALL: Within normal limits.  BONES: Posterior disc protrusion at L3-L4.  IMPRESSION:  No nephrolithiasis.  No obstructive hydronephrosis.  Subtle infiltration of the left perinephric fat. Possible left-sided   acute pyelonephritis. This can reallyonly be diagnosed with IV contrast.

## 2025-05-14 NOTE — DISCHARGE NOTE PROVIDER - HOSPITAL COURSE
#Discharge: do not delete    44 year old F with PSH of left oophorectomy following ectopic pregnancy, presenting with a week of dysuria and fever. No improvement on oral Macrobid and Cipro although did not complete either course. Found to have a likely pyelonephritis on CT. Admitted for IV CTX. Culture data obtained from outpatient PCP and found to have pan-sensitive UTI. Clinically improved and optimized for discharge on oral TMP-SMX for total of 10 day course.     Problem List/Main Diagnoses:  #Acute sepsis secondary to pyelonephritis  Patient met 2/4 sepsis criteria ( HR, fever) with source likely UTI and pyelonephritis seen on CT, although non-con  Culture data obtained from outpatient records on original culture > appears to be pan-sensitive E. coli.   Plan: discharge on total of 10 day course of TMP-SMX  Will call for updated blood culture or urine culture results that show resistance    #S/P left oophorectomy.   2/2 ectopic pregnancy in 2019. No current issues.    #Mild anemia.   Hb 10.8 on admission. Baseline 12 on 2015. MCV WNL.     New medications/therapies: TMP-SMX DS for total of 10 days  Medications stopped: none  New lines/hardware: none  Labs to be followed outpatient: none  Exam to be followed outpatient: none    Discharge plan: discharge to home    Physical Exam Upon Discharge:  GEN: Resting comfortably in NAD  ENMT: Moist oral mucosa; no conjunctival injection  RESP: No respiratory distress, no use of accessory muscles; CTA b/l, no WRR  CV: RRR, +S1S2, no MRG; no JVD; no peripheral edema  2+ radial, DP, PT   GI: Soft, ND, no rebound, no guarding. Mildly tender to deep palpation in LLQ.  MSK: No digital clubbing or cyanosis. Mild L flank tenderness  SKIN: No rashes or ulcers noted  NEURO: Answers questions appropriately, moving all extremities spontaneously    Patient was medically optimized, stable and ready for discharge. Plan of care and return precautions were discussed with the patient who verbally stated understanding. On the day of discharge, the patient was seen and examined. Symptoms improved. Vital signs are stable. Labs and imaging reviewed. Patient is medically optimized and hemodynamically stable. Return precautions discussed, medication teach back done, and importance of physician follow up emphasized. The patient verbalized understanding. #Discharge: do not delete    44 year old F with PSH of left oophorectomy following ectopic pregnancy, presenting with a week of dysuria and fever. No improvement on oral Macrobid and Cipro although did not complete either course (only took around 2 days). Found to have L pyelonephritis on CT. Admitted for IV CTX. Culture data obtained from outpatient PCP and found to have pan-sensitive E Coli UTI. Clinically improved and optimized for discharge on oral TMP-SMX for total of 10 day course.     Problem List/Main Diagnoses:  #Acute sepsis secondary to pyelonephritis  Patient met 2/4 sepsis criteria ( HR, fever) with source likely UTI and pyelonephritis seen on CT, although non-con  Culture data obtained from outpatient records on original culture > appears to be pan-sensitive E. coli.   Plan: discharge on total of 10 day course of TMP-SMX, per ptn centered discussion plan for dc today given clinical improvement.  Ucx and bcx inpatient pending, ptn counseled will inpatient team to update her if any pertinent positives.  Ptn counseled on strict return precautions, symptoms monitoring and close PCP follow up.    #S/P left oophorectomy.   2/2 ectopic pregnancy in 2019. No current issues.    #Mild anemia.   Hb 10.8 on admission. Baseline 12 on 2015. MCV WNL.  Iron panel w iron deficiency, will dc on PO iron every other day    New medications/therapies: TMP-SMX DS for total of 10 days  Medications stopped: none  New lines/hardware: none  Labs to be followed outpatient: none  Exam to be followed outpatient: none    Discharge plan: discharge to home    Physical Exam Upon Discharge:  GEN: Resting comfortably in NAD  ENMT: Moist oral mucosa; no conjunctival injection  RESP: No respiratory distress, no use of accessory muscles; CTA b/l, no WRR  CV: RRR, +S1S2, no MRG; no JVD; no peripheral edema  2+ radial, DP, PT   GI: Soft, ND, no rebound, no guarding.   MSK: No digital clubbing or cyanosis. Mild L flank tenderness  SKIN: No rashes or ulcers noted  NEURO: Answers questions appropriately, moving all extremities spontaneously    Patient was medically optimized, stable and ready for discharge. Plan of care and return precautions were discussed with the patient who verbally stated understanding. On the day of discharge, the patient was seen and examined. Symptoms improved. Vital signs are stable. Labs and imaging reviewed. Patient is medically optimized and hemodynamically stable. Return precautions discussed, medication teach back done, and importance of physician follow up emphasized. The patient verbalized understanding.   #Discharge: do not delete    44 year old F with PSH of left oophorectomy following ectopic pregnancy, presenting with a week of dysuria and fever. No improvement on oral Macrobid and Cipro although did not complete either course (only took around 2 days). Found to have L pyelonephritis on CT. Admitted for IV CTX. Culture data obtained from outpatient PCP and found to have pan-sensitive E Coli UTI. Clinically improved and optimized for discharge on oral TMP-SMX for total of 10 day course.     Problem List/Main Diagnoses:  #Acute sepsis secondary to pyelonephritis  Patient met 2/4 sepsis criteria ( HR, fever) with source likely UTI and pyelonephritis seen on CT, although non-con  Culture data obtained from outpatient records on original culture > appears to be pan-sensitive E. coli.   Plan: discharge on total of 10 day course of TMP-SMX, per ptn centered discussion plan for dc today given clinical improvement.  Ucx and bcx inpatient pending, ptn counseled will inpatient team to update her if any pertinent positives.  Ptn counseled on strict return precautions, symptoms monitoring and close PCP follow up.    #S/P left oophorectomy.   2/2 ectopic pregnancy in 2019. No current issues.    #Mild anemia.   Hb 10.8 on admission. Baseline 12 on 2015. MCV WNL.  Iron panel w iron deficiency, will dc on PO iron every other day    New medications/therapies: TMP-SMX DS for total of 10 days, iron sulfate 325mg every other day  Medications stopped: none  New lines/hardware: none  Labs to be followed outpatient: none  Exam to be followed outpatient: none    Discharge plan: discharge to home    Physical Exam Upon Discharge:  GEN: Resting comfortably in NAD  ENMT: Moist oral mucosa; no conjunctival injection  RESP: No respiratory distress, no use of accessory muscles; CTA b/l, no WRR  CV: RRR, +S1S2, no MRG; no JVD; no peripheral edema  2+ radial, DP, PT   GI: Soft, ND, no rebound, no guarding.   MSK: No digital clubbing or cyanosis. Mild L flank tenderness  SKIN: No rashes or ulcers noted  NEURO: Answers questions appropriately, moving all extremities spontaneously    Patient was medically optimized, stable and ready for discharge. Plan of care and return precautions were discussed with the patient who verbally stated understanding. On the day of discharge, the patient was seen and examined. Symptoms improved. Vital signs are stable. Labs and imaging reviewed. Patient is medically optimized and hemodynamically stable. Return precautions discussed, medication teach back done, and importance of physician follow up emphasized. The patient verbalized understanding.

## 2025-05-14 NOTE — DISCHARGE NOTE PROVIDER - NSDCCPCAREPLAN_GEN_ALL_CORE_FT
PRINCIPAL DISCHARGE DIAGNOSIS  Diagnosis: Pyelonephritis  Assessment and Plan of Treatment: Pyelonephritis is a type of urinary tract infection where the kidneys become inflamed due to bacterial infection. Symptoms can include fever, chills, back or flank pain, nausea, and frequent, painful urination. Treatment will involve antibiotics to eliminate the infection. It's important to stay well-hydrated and complete the full course of prescribed antibiotics to ensure the infection is fully eradicated. Our office will call if there are any new urgent changes from the labs that were drawn at this hospitalization.   Return Precautions: Seek immediate medical attention if you experience worsening pain, high fever, persistent nausea or vomiting, or signs of dehydration, as these symptoms may indicate complications requiring urgent care. Ensure you attend all follow-up appointments to monitor your recovery and the resolution of the infection.  Discharge instructions:  1. Please take TMP-SMX DS twice a day starting on 5/15 AM and finish the course.  2. Have a discussion with your PCP regarding a potential urology follow up.     PRINCIPAL DISCHARGE DIAGNOSIS  Diagnosis: Pyelonephritis  Assessment and Plan of Treatment: Pyelonephritis is a type of urinary tract infection where the kidneys become inflamed due to bacterial infection. Symptoms can include fever, chills, back or flank pain, nausea, and frequent, painful urination. Treatment will involve antibiotics to eliminate the infection. It's important to stay well-hydrated and complete the full course of prescribed antibiotics to ensure the infection is fully eradicated. Our office will call if there are any new urgent changes from the labs that were drawn at this hospitalization.   Return Precautions: Seek immediate medical attention if you experience worsening pain, high fever, persistent nausea or vomiting, or signs of dehydration, as these symptoms may indicate complications requiring urgent care. Ensure you attend all follow-up appointments to monitor your recovery and the resolution of the infection.  Discharge instructions:  1. Please take TMP-SMX DS twice a day starting on 5/15 AM and finish the course.  2. Have a discussion with your PCP regarding a potential urology follow up.      SECONDARY DISCHARGE DIAGNOSES  Diagnosis: Mild anemia  Assessment and Plan of Treatment: Iron deficiency anemia is a condition where there is a lack of sufficient iron in the body to produce hemoglobin, leading to decreased oxygen transport in the blood. This can result in symptoms such as fatigue, pallor, dizziness, and shortness of breath. While inpatient, you were found to have mild iron deficiency anemia and will be prescribed iron tablets to take every other day.  Return Precautions: Seek immediate medical attention if you experience signs of worsening anemia, such as increasing weakness, chest pain, rapid heartbeat, or shortness of breath, as these may require urgent care. Discuss any new symptoms or concerns, especially those related to gastrointestinal health, with your healthcare provider promptly.   Discharge instructions:  1. Please taken iron tablets every other day.  2. Please follow up with your PCP regarding how long you should take these tablets for.

## 2025-05-14 NOTE — PATIENT PROFILE ADULT - NSPROPASSIVESMOKEEXPOSURE_GEN_A_NUR
Patient is having a lot of digestion issues, and every morning he is nausea. He is asking if he can be squeezed in for appt or Video visit today or tomorrow.         Please advise, [phone]
No

## 2025-05-15 RX ORDER — SULFAMETHOXAZOLE/TRIMETHOPRIM 800-160 MG
1 TABLET ORAL
Qty: 16 | Refills: 0
Start: 2025-05-15 | End: 2025-05-22

## 2025-05-18 LAB
CULTURE RESULTS: SIGNIFICANT CHANGE UP
CULTURE RESULTS: SIGNIFICANT CHANGE UP
SPECIMEN SOURCE: SIGNIFICANT CHANGE UP
SPECIMEN SOURCE: SIGNIFICANT CHANGE UP

## 2025-05-22 DIAGNOSIS — Z90.721 ACQUIRED ABSENCE OF OVARIES, UNILATERAL: ICD-10-CM

## 2025-05-22 DIAGNOSIS — I45.10 UNSPECIFIED RIGHT BUNDLE-BRANCH BLOCK: ICD-10-CM

## 2025-05-22 DIAGNOSIS — N12 TUBULO-INTERSTITIAL NEPHRITIS, NOT SPECIFIED AS ACUTE OR CHRONIC: ICD-10-CM

## 2025-05-22 DIAGNOSIS — Z87.440 PERSONAL HISTORY OF URINARY (TRACT) INFECTIONS: ICD-10-CM

## 2025-05-22 DIAGNOSIS — D50.9 IRON DEFICIENCY ANEMIA, UNSPECIFIED: ICD-10-CM

## 2025-05-22 DIAGNOSIS — B96.20 UNSPECIFIED ESCHERICHIA COLI [E. COLI] AS THE CAUSE OF DISEASES CLASSIFIED ELSEWHERE: ICD-10-CM

## 2025-05-22 DIAGNOSIS — A41.9 SEPSIS, UNSPECIFIED ORGANISM: ICD-10-CM

## 2025-05-22 DIAGNOSIS — N10 ACUTE PYELONEPHRITIS: ICD-10-CM

## 2025-05-22 DIAGNOSIS — Z11.52 ENCOUNTER FOR SCREENING FOR COVID-19: ICD-10-CM
